# Patient Record
Sex: MALE | Race: WHITE
[De-identification: names, ages, dates, MRNs, and addresses within clinical notes are randomized per-mention and may not be internally consistent; named-entity substitution may affect disease eponyms.]

---

## 2020-02-03 ENCOUNTER — HOSPITAL ENCOUNTER (EMERGENCY)
Dept: HOSPITAL 50 - VM.ED | Age: 65
Discharge: ANOTHER HOSPITAL | End: 2020-02-03
Payer: MEDICARE

## 2020-02-03 DIAGNOSIS — R29.818: Primary | ICD-10-CM

## 2020-02-03 LAB
ANION GAP SERPL CALC-SCNC: 15.6 MMOL/L (ref 10–20)
CHLORIDE SERPL-SCNC: 105 MMOL/L (ref 98–107)
SODIUM SERPL-SCNC: 145 MMOL/L (ref 136–145)

## 2020-02-03 NOTE — CT
______________________________________________________________________________   

  

[id number] CT/CT Head WO IV  

EXAM: CT Head WO IV  

   

 CLINICAL DATA: ? CVA  

   

 COMPARISON STUDY: None  

   

 FINDINGS:  

   

 No intracranial hemorrhage, extra-axial fluid collection, mass, or acute  

 ischemia.   

   

 No hydrocephalus. Paranasal sinuses and mastoid air cells are clear.  

    

 IMPRESSION:  

   

 No acute intracranial findings.  

   

 Electronically signed by Bertin Warren MD on 2/3/2020 8:15 PM  

   

  

Bertin Warren MD                 

 02/03/20 2017    

  

Thank you for allowing us to participate in the care of your patient.

## 2020-02-03 NOTE — EDM.PDOC
ED HPI GENERAL MEDICAL PROBLEM





- General


Chief Complaint: Neuro Symptoms/Deficits


Stated Complaint: Facial numbness and watering of the left eye


Time Seen by Provider: 02/03/20 19:45


Source of Information: Reports: Patient, Family


History Limitations: Reports: No Limitations





- History of Present Illness


INITIAL COMMENTS - FREE TEXT/NARRATIVE: 





Patient states today at about 12:00 he started having intense watering of the 

left eye with kind of a numb sensation over the left side of the face did not 

think a whole lot of it went on about his day while eating supper with his wife 

night about 6:00 started losing facial sensation to the left side mostly around 

the upper lip with a little bit of numbness to the left side of the tongue 

states he cannot spit or whistle now.





He denies any headache vision changes trouble with speech weakness in any of 

the extremities





He currently has high cholesterol and hyperlipidemia he has no cardiac issues





No family history of strokes or cardiac issues


Onset: Today


Duration: Hour(s):


Associated Symptoms: Reports: Other (Patient states over the last couple days 

while in [place] he did start feeling like he had a cold and last night felt 

like he kind of got stopped up as well just did not feel well but today he said 

he felt normal).  Denies: Confusion, Diaphoresis, Fever/Chills, Headaches, Loss 

of Appetite, Nausea/Vomiting, Shortness of Breath, Syncope





ED ROS GENERAL





- Review of Systems


Review Of Systems: See Below


Constitutional: Reports: No Symptoms.  Denies: Fever, Chills, Malaise, Weakness

, Fatigue, Night Sweats, Diaphoresis, Decreased Appetite


HEENT: Reports: Eye Discharge.  Denies: Dental Pain, Ear Pain, Eye Pain, 

Hearing Loss, Nose Pain, Rhinitis, Vertigo, Vision Change


Respiratory: Reports: No Symptoms


Cardiovascular: Reports: No Symptoms


Endocrine: Reports: No Symptoms


GI/Abdominal: Reports: No Symptoms


: Reports: No Symptoms


Musculoskeletal: Reports: No Symptoms


Skin: Reports: No Symptoms


Neurological: Reports: Paresthesia, Tingling.  Denies: Confusion, Dizziness, 

Headache, Numbness, Pre-Existing Deficit, Seizure, Syncope, Tremors, Trouble 

Speaking, Difficulty Walking, Weakness


Psychiatric: Reports: No Symptoms


Hematologic/Lymphatic: Reports: No Symptoms


Immunologic: Reports: No Symptoms





ED EXAM, GENERAL





- Physical Exam


Exam: See Below


Exam Limited By: No Limitations


General Appearance: Alert, WD/WN, No Apparent Distress


Eye Exam: Bilateral Eye: EOMI, PERRL, Other (Patient has noted clear watery 

discharge for the left eye there is no signs of any injection or inflammation)


Ears: Normal External Exam, Normal Canal, Hearing Grossly Normal, Normal TMs


Nose: Normal Inspection, Normal Mucosa, No Blood


Throat/Mouth: Normal Inspection, Normal Lips, Normal Teeth, Normal Gums, Normal 

Oropharynx, Normal Voice, No Airway Compromise


Head: Atraumatic, Normocephalic.  No: Facial Swelling, Facial Tenderness


Neck: Normal Inspection, Supple, Non-Tender, Full Range of Motion


Respiratory/Chest: No Respiratory Distress, Lungs Clear, Normal Breath Sounds, 

No Accessory Muscle Use


Cardiovascular: Normal Peripheral Pulses, Regular Rate, Rhythm, No Edema, No 

Gallop, No JVD


GI/Abdominal: Normal Bowel Sounds, Soft, Non-Tender, No Organomegaly, No 

Distention


Back Exam: Normal Inspection, Full Range of Motion.  No: Decreased Range of 

Motion


Extremities: Normal Inspection, Normal Range of Motion, Non-Tender, No Pedal 

Edema


Neurological: Alert, Oriented, CN II-XII Intact, Normal Cognition, Normal Gait, 

Normal Reflexes, No Motor/Sensory Deficits, Other (Cranial nerves II through 

XII are intact negative pronator drift 5/5 upper extremity lower extremities 

bilateral has equal  facial sensation is intact across T5)


Psychiatric: Normal Affect, Normal Mood.  No: Tearful


Skin Exam: Warm, Dry, Intact, Normal Color, No Rash





Course





- Vital Signs


Text/Narrative:: 





Stroke protocol followed


Not able to fully differentiate between physical exam of Bell's palsy versus 

stroke neurology was consulted








Spoke with Dr. MARRERO neurology who agrees that the patient needs to be 

worked up for further evaluation he states he will call the ER and get an MRI 

set up for tonight he spoke with Dr. FLOR ER who is the accepting 

[id number]














- Orders/Labs/Meds


Orders: 





 Active Orders 24 hr











 Category Date Time Status


 


 Head wo Cont [CT] Stat Exams  02/03/20 19:54 Ordered


 


 BASIC METABOLIC PANEL,BMP [CHEM] Stat Lab  02/03/20 19:54 Ordered


 


 CBC WITH AUTO DIFF [HEME] Stat Lab  02/03/20 19:54 Ordered


 


 INR,PT,PROTHROMBIN TIME [COAG] Stat Lab  02/03/20 19:54 Ordered


 


 TROPONIN I [CHEM] Stat Lab  02/03/20 19:55 Ordered











Labs: 





 Laboratory Tests











  02/03/20 Range/Units





  19:49 


 


POC Glucose  100  ()  mg/dL














Departure





- Departure


Time of Disposition: 20:30


Disposition: DC/[facility]fer to [facility]


Condition: Good


Clinical Impression: 


 Neurosensory deficit








- Discharge Information


Forms:  ED Department Discharge





- Problem List & Annotations


(1) Neurosensory deficit


SNOMED Code(s): 101221254


   Code(s): R29.90 - UNSPECIFIED SYMPTOMS AND SIGNS INVOLVING THE NERVOUS 

SYSTEM   Status: Acute   





- My Orders


Last 24 Hours: 





My Active Orders





02/03/20 19:54


Head wo Cont [CT] Stat 


BASIC METABOLIC PANEL,BMP [CHEM] Stat 


CBC WITH AUTO DIFF [HEME] Stat 


INR,PT,PROTHROMBIN TIME [COAG] Stat 





02/03/20 19:55


TROPONIN I [CHEM] Stat 














- Assessment/Plan


Last 24 Hours: 





My Active Orders





02/03/20 19:54


Head wo Cont [CT] Stat 


BASIC METABOLIC PANEL,BMP [CHEM] Stat 


CBC WITH AUTO DIFF [HEME] Stat 


INR,PT,PROTHROMBIN TIME [COAG] Stat 





02/03/20 19:55


TROPONIN I [CHEM] Stat